# Patient Record
Sex: FEMALE | URBAN - METROPOLITAN AREA
[De-identification: names, ages, dates, MRNs, and addresses within clinical notes are randomized per-mention and may not be internally consistent; named-entity substitution may affect disease eponyms.]

---

## 2022-01-15 ENCOUNTER — NURSE TRIAGE (OUTPATIENT)
Dept: OTHER | Facility: OTHER | Age: 32
End: 2022-01-15

## 2022-01-15 NOTE — TELEPHONE ENCOUNTER
Patient called reporting recent positive PCR test and is requesting repeat testing to return to work  Per CDC policy, PCR test is not indicated  Patient aware and referred to [facility] website for additional questions  Patient expressed understanding      Reason for Disposition   [1] COVID-19 diagnosed by positive lab test (e g , PCR, rapid self-test kit) AND [2] mild symptoms (e g , cough, fever, others) AND [6] no complications or SOB    Protocols used: CORONAVIRUS (COVID-19) DIAGNOSED OR SUSPECTED-[facility]

## 2022-01-15 NOTE — TELEPHONE ENCOUNTER
Regarding: covid symptomatic-cough, congestion  ----- Message from Lis Negrete sent at 1/15/2022  9:07 AM EST -----  "I have a cough and congestion  "